# Patient Record
Sex: FEMALE | Race: WHITE | NOT HISPANIC OR LATINO | Employment: FULL TIME | URBAN - METROPOLITAN AREA
[De-identification: names, ages, dates, MRNs, and addresses within clinical notes are randomized per-mention and may not be internally consistent; named-entity substitution may affect disease eponyms.]

---

## 2017-01-30 ENCOUNTER — HOSPITAL ENCOUNTER (OUTPATIENT)
Dept: SLEEP MEDICINE | Facility: HOSPITAL | Age: 37
Discharge: HOME | End: 2017-01-30
Attending: PSYCHIATRY & NEUROLOGY | Admitting: PSYCHIATRY & NEUROLOGY

## 2019-10-08 ENCOUNTER — HOSPITAL ENCOUNTER (EMERGENCY)
Facility: HOSPITAL | Age: 39
Discharge: HOME | End: 2019-10-09
Admitting: EMERGENCY MEDICINE

## 2019-10-08 ENCOUNTER — APPOINTMENT (OUTPATIENT)
Dept: CT IMAGING | Facility: HOSPITAL | Age: 39
End: 2019-10-08

## 2019-10-08 ENCOUNTER — APPOINTMENT (OUTPATIENT)
Dept: GENERAL RADIOLOGY | Facility: HOSPITAL | Age: 39
End: 2019-10-08

## 2019-10-08 DIAGNOSIS — R42 LIGHTHEADED: ICD-10-CM

## 2019-10-08 DIAGNOSIS — R07.89 ATYPICAL CHEST PAIN: Primary | ICD-10-CM

## 2019-10-08 DIAGNOSIS — F41.9 ANXIETY: ICD-10-CM

## 2019-10-08 LAB
ALBUMIN SERPL-MCNC: 3.9 G/DL (ref 3.5–4.8)
ALBUMIN/GLOB SERPL: 1.3 G/DL (ref 1–1.7)
ALP SERPL-CCNC: 39 U/L (ref 32–91)
ALT SERPL W P-5'-P-CCNC: 17 U/L (ref 14–54)
ANION GAP SERPL CALCULATED.3IONS-SCNC: 12 MMOL/L (ref 5–15)
AST SERPL-CCNC: 18 U/L (ref 15–41)
BASOPHILS # BLD AUTO: 0 10*3/MM3 (ref 0–0.2)
BASOPHILS NFR BLD AUTO: 1.4 % (ref 0–1.5)
BILIRUB SERPL-MCNC: 0.7 MG/DL (ref 0.3–1.2)
BNP SERPL-MCNC: 14 PG/ML
BUN BLD-MCNC: 8 MG/DL (ref 8–20)
BUN/CREAT SERPL: 11.4 (ref 5.4–26.2)
CALCIUM SPEC-SCNC: 8.6 MG/DL (ref 8.9–10.3)
CHLORIDE SERPL-SCNC: 102 MMOL/L (ref 101–111)
CO2 SERPL-SCNC: 28 MMOL/L (ref 22–32)
CREAT BLD-MCNC: 0.7 MG/DL (ref 0.4–1)
D DIMER PPP FEU-MCNC: 0.38 MCGFEU/ML (ref 0.17–0.59)
DEPRECATED RDW RBC AUTO: 38.5 FL (ref 37–54)
EOSINOPHIL # BLD AUTO: 0 10*3/MM3 (ref 0–0.4)
EOSINOPHIL NFR BLD AUTO: 1.2 % (ref 0.3–6.2)
ERYTHROCYTE [DISTWIDTH] IN BLOOD BY AUTOMATED COUNT: 12.3 % (ref 12.3–15.4)
GFR SERPL CREATININE-BSD FRML MDRD: 94 ML/MIN/1.73
GLOBULIN UR ELPH-MCNC: 3 GM/DL (ref 2.5–3.8)
GLUCOSE BLD-MCNC: 108 MG/DL (ref 65–99)
HCT VFR BLD AUTO: 33.3 % (ref 34–46.6)
HGB BLD-MCNC: 11.8 G/DL (ref 12–15.9)
LIPASE SERPL-CCNC: 24 U/L (ref 22–51)
LYMPHOCYTES # BLD AUTO: 1.1 10*3/MM3 (ref 0.7–3.1)
LYMPHOCYTES NFR BLD AUTO: 30.1 % (ref 19.6–45.3)
MCH RBC QN AUTO: 31.6 PG (ref 26.6–33)
MCHC RBC AUTO-ENTMCNC: 35.6 G/DL (ref 31.5–35.7)
MCV RBC AUTO: 88.9 FL (ref 79–97)
MONOCYTES # BLD AUTO: 0.4 10*3/MM3 (ref 0.1–0.9)
MONOCYTES NFR BLD AUTO: 11.3 % (ref 5–12)
NEUTROPHILS # BLD AUTO: 2 10*3/MM3 (ref 1.7–7)
NEUTROPHILS NFR BLD AUTO: 56 % (ref 42.7–76)
NRBC BLD AUTO-RTO: 0.1 /100 WBC (ref 0–0.2)
PLATELET # BLD AUTO: 224 10*3/MM3 (ref 140–450)
PMV BLD AUTO: 7.5 FL (ref 6–12)
POTASSIUM BLD-SCNC: 3 MMOL/L (ref 3.6–5.1)
PROT SERPL-MCNC: 6.9 G/DL (ref 6.1–7.9)
RBC # BLD AUTO: 3.74 10*6/MM3 (ref 3.77–5.28)
SODIUM BLD-SCNC: 139 MMOL/L (ref 136–144)
TROPONIN I SERPL-MCNC: <0.03 NG/ML (ref 0–0.03)
WBC NRBC COR # BLD: 3.6 10*3/MM3 (ref 3.4–10.8)

## 2019-10-08 PROCEDURE — 85025 COMPLETE CBC W/AUTO DIFF WBC: CPT | Performed by: NURSE PRACTITIONER

## 2019-10-08 PROCEDURE — 71045 X-RAY EXAM CHEST 1 VIEW: CPT

## 2019-10-08 PROCEDURE — 99284 EMERGENCY DEPT VISIT MOD MDM: CPT

## 2019-10-08 PROCEDURE — 83880 ASSAY OF NATRIURETIC PEPTIDE: CPT | Performed by: NURSE PRACTITIONER

## 2019-10-08 PROCEDURE — 80053 COMPREHEN METABOLIC PANEL: CPT | Performed by: NURSE PRACTITIONER

## 2019-10-08 PROCEDURE — 93005 ELECTROCARDIOGRAM TRACING: CPT | Performed by: NURSE PRACTITIONER

## 2019-10-08 PROCEDURE — 70450 CT HEAD/BRAIN W/O DYE: CPT

## 2019-10-08 PROCEDURE — 83690 ASSAY OF LIPASE: CPT | Performed by: NURSE PRACTITIONER

## 2019-10-08 PROCEDURE — 84484 ASSAY OF TROPONIN QUANT: CPT | Performed by: NURSE PRACTITIONER

## 2019-10-08 PROCEDURE — 85379 FIBRIN DEGRADATION QUANT: CPT | Performed by: NURSE PRACTITIONER

## 2019-10-08 RX ORDER — POTASSIUM CHLORIDE 20 MEQ/1
20 TABLET, EXTENDED RELEASE ORAL DAILY
Status: DISCONTINUED | OUTPATIENT
Start: 2019-10-08 | End: 2019-10-09 | Stop reason: HOSPADM

## 2019-10-08 RX ORDER — BUSPIRONE HYDROCHLORIDE 5 MG/1
7.5 TABLET ORAL 2 TIMES DAILY
COMMUNITY

## 2019-10-08 RX ORDER — ESCITALOPRAM OXALATE 10 MG/1
10 TABLET ORAL DAILY
COMMUNITY

## 2019-10-08 RX ORDER — SODIUM CHLORIDE 0.9 % (FLUSH) 0.9 %
10 SYRINGE (ML) INJECTION AS NEEDED
Status: DISCONTINUED | OUTPATIENT
Start: 2019-10-08 | End: 2019-10-09 | Stop reason: HOSPADM

## 2019-10-08 RX ADMIN — POTASSIUM CHLORIDE 20 MEQ: 1500 TABLET, EXTENDED RELEASE ORAL at 22:37

## 2019-10-08 RX ADMIN — SODIUM CHLORIDE 500 ML: 900 INJECTION, SOLUTION INTRAVENOUS at 22:12

## 2019-10-09 VITALS
DIASTOLIC BLOOD PRESSURE: 93 MMHG | WEIGHT: 229.06 LBS | TEMPERATURE: 98.4 F | SYSTOLIC BLOOD PRESSURE: 106 MMHG | BODY MASS INDEX: 31.03 KG/M2 | HEART RATE: 93 BPM | HEIGHT: 72 IN | RESPIRATION RATE: 18 BRPM | OXYGEN SATURATION: 100 %

## 2019-10-09 NOTE — ED NOTES
Pt reports she is under a lot of stress at work, pt reports chest tightness this morning around 0530, pt reports short intermitted chest tightness throughout today, pt reports she thinks it might be panic attacks, pt also reports she has been constipated x 3 days and had a diarrhea episode this morning, pt reports abdominal fullness, pt also c/o of dull intermitted headaches starting today.     Elizabeth Grajeda, RN  10/08/19 2057

## 2019-10-09 NOTE — DISCHARGE INSTRUCTIONS
Ensure adequate fluid intake, good healthy diet, attempt to employee other methods for anxiety, removed self from situation, listen to music, light massage,; 10 your medications as prescribed by your primary care physician    Follow-up with cardiology referral below for further evaluation of symptoms.    Return to the ER for any worsening symptoms including increase or change in pain, shortness of breath, irregular heartbeat rapid heartbeat fainting near fainting episodes or other worsening symptoms

## 2019-10-09 NOTE — ED PROVIDER NOTES
Subjective   Context: 39-year-old female patient presents the ER with complaint of developing chest tightness; patient reports she been very stressed at work, she states she recently accepted a new position, she states that she is having trouble adjusting.  She reports that at 530 this morning she developed a tightness to the right side of her chest, nonradiating.  She states that it lasted approximately 5 to 10 minutes.  She reports she was thinking about her job whenever the pain began.  She states she had no associated shortness of breath.  She states she did become very hot, felt briefly lightheaded.  Patient reports that due to her increased anxiety she recently resumed her [employer], she states that she has had minimal improvement in her anxiety symptoms.  She reports that she had a dull headache today she states that it was gradual onset with no thunderclap.  Patient reports no hopelessness denies SI HI, she states that she has had no anginal equivalent chest pain tachycardia irregular heartbeat fainting or near fainting episodes.      Location: R chest   Quality:tightness  Timin  Duration: brief  Aggravating: thinking about work  Alleviating:deep breathing    PCP:  None            Review of Systems   Constitutional: Negative for chills, diaphoresis and fever.        Felt hot   HENT: Negative for nosebleeds, sinus pressure, sinus pain and trouble swallowing.    Eyes: Negative for visual disturbance.   Respiratory: Positive for chest tightness. Negative for apnea, cough, shortness of breath, wheezing and stridor.    Cardiovascular: Negative for chest pain, palpitations and leg swelling.   Gastrointestinal: Positive for nausea. Negative for abdominal pain, constipation, diarrhea and vomiting.   Genitourinary: Negative for difficulty urinating and hematuria.   Musculoskeletal: Negative for back pain, joint swelling and myalgias.   Skin: Negative for color change, pallor and rash.   Neurological: Negative for  dizziness, syncope, light-headedness, numbness and headaches.   Hematological: Does not bruise/bleed easily.     Prior to Admission medications    Medication Sig Start Date End Date Taking? Authorizing Provider   busPIRone (BUSPAR) 5 MG tablet Take 7.5 mg by mouth 2 (Two) Times a Day.   Yes ProviderHunter MD   escitalopram (LEXAPRO) 10 MG tablet Take 10 mg by mouth Daily.   Yes Provider, MD Hunter         Past Medical History:   Diagnosis Date   • Depression    • Preeclampsia 2006       Allergies   Allergen Reactions   • Sulfa Antibiotics Hives       Past Surgical History:   Procedure Laterality Date   •  SECTION         History reviewed. No pertinent family history.    Social History     Socioeconomic History   • Marital status:      Spouse name: Not on file   • Number of children: Not on file   • Years of education: Not on file   • Highest education level: Not on file   Tobacco Use   • Smoking status: Never Smoker   Substance and Sexual Activity   • Alcohol use: No     Frequency: Never   • Drug use: No   • Sexual activity: Defer           Objective   Physical Exam       Vital signs and triage nurse note reviewed.   Constitutional: Awake, alert; well-developed and well-nourished. No acute distress is noted.   HEENT: Normocephalic, atraumatic; pupils are PERRL with intact EOM; oropharynx is pink and moist without exudate or erythema.   Neck: Supple, full range of motion without pain; trachea midline without palpable mass cardiovascular: Regular rate and rhythm, normal S1-S2.   Pulmonary: Respiratory effort regular nonlabored, breath sounds clear to auscultation all fields.  Reproducible chest wall tenderness is noted to the right anterior upper chest, no overlying erythema or ecchymosis no flail segment or subcu emphysema   Abdomen: Soft, nontender nondistended with normoactive bowel sounds; no rebound or guarding.   Musculoskeletal: Independent range of motion of all extremities with no  palpable tenderness or edema.  Negative Homans sign bilateral lower extremities   Neuro: Alert oriented x3, speech is clear and appropriate, GCS 15   Skin:  Fleshtone warm, dry, intact; no erythematous or petechial rash or lesion    Procedures           ED Course    Ct Head Without Contrast    Result Date: 10/8/2019  1.  Normal exam.  Electronically Signed ByRyan Morataya On:10/8/2019 10:24 PM This report was finalized on [id number] by  Michelle Unger    Xr Chest 1 View    Result Date: 10/8/2019  1.  No evidence of active disease.   Electronically Signed ByRyan Morataya On:10/8/2019 9:38 PM This report was finalized on [id number] by  Janet Morataya .    Results for orders placed or performed during the hospital encounter of 10/08/19   Comprehensive Metabolic Panel   Result Value Ref Range    Glucose 108 (H) 65 - 99 mg/dL    BUN 8 8 - 20 mg/dL    Creatinine 0.70 0.40 - 1.00 mg/dL    Sodium 139 136 - 144 mmol/L    Potassium 3.0 (L) 3.6 - 5.1 mmol/L    Chloride 102 101 - 111 mmol/L    CO2 28.0 22.0 - 32.0 mmol/L    Calcium 8.6 (L) 8.9 - 10.3 mg/dL    Total Protein 6.9 6.1 - 7.9 g/dL    Albumin 3.90 3.50 - 4.80 g/dL    ALT (SGPT) 17 14 - 54 U/L    AST (SGOT) 18 15 - 41 U/L    Alkaline Phosphatase 39 32 - 91 U/L    Total Bilirubin 0.7 0.3 - 1.2 mg/dL    eGFR Non African Amer 94 >60 mL/min/1.73    Globulin 3.0 2.5 - 3.8 gm/dL    A/G Ratio 1.3 1.0 - 1.7 g/dL    BUN/Creatinine Ratio 11.4 5.4 - 26.2    Anion Gap 12.0 5.0 - 15.0 mmol/L   Lipase   Result Value Ref Range    Lipase 24 22 - 51 U/L   D-dimer, Quantitative   Result Value Ref Range    D-Dimer, Quantitative 0.38 0.17 - 0.59 MCGFEU/mL   Troponin   Result Value Ref Range    Troponin I <0.030 0.000 - 0.030 ng/mL   BNP   Result Value Ref Range    BNP 14.0 <=100.0 pg/mL   CBC Auto Differential   Result Value Ref Range    WBC 3.60 3.40 - 10.80 10*3/mm3    RBC 3.74 (L) 3.77 - 5.28 10*6/mm3    Hemoglobin 11.8 (L) 12.0 - 15.9 g/dL    Hematocrit 33.3 (L) 34.0 - 46.6 %  "   MCV 88.9 79.0 - 97.0 fL    MCH 31.6 26.6 - 33.0 pg    MCHC 35.6 31.5 - 35.7 g/dL    RDW 12.3 12.3 - 15.4 %    RDW-SD 38.5 37.0 - 54.0 fl    MPV 7.5 6.0 - 12.0 fL    Platelets 224 140 - 450 10*3/mm3    Neutrophil % 56.0 42.7 - 76.0 %    Lymphocyte % 30.1 19.6 - 45.3 %    Monocyte % 11.3 5.0 - 12.0 %    Eosinophil % 1.2 0.3 - 6.2 %    Basophil % 1.4 0.0 - 1.5 %    Neutrophils, Absolute 2.00 1.70 - 7.00 10*3/mm3    Lymphocytes, Absolute 1.10 0.70 - 3.10 10*3/mm3    Monocytes, Absolute 0.40 0.10 - 0.90 10*3/mm3    Eosinophils, Absolute 0.00 0.00 - 0.40 10*3/mm3    Basophils, Absolute 0.00 0.00 - 0.20 10*3/mm3    nRBC 0.1 0.0 - 0.2 /100 WBC     Medications   sodium chloride 0.9 % flush 10 mL (not administered)   potassium chloride (K-DUR,KLOR-CON) CR tablet 20 mEq (20 mEq Oral Given 10/8/19 2237)   sodium chloride 0.9 % bolus 500 mL (0 mL Intravenous Stopped 10/8/19 2237)     /93   Pulse 93   Temp 98.4 °F (36.9 °C) (Oral)   Resp 18   Ht 182.9 cm (72\")   Wt 104 kg (229 lb 0.9 oz)   SpO2 100%   BMI 31.07 kg/m²             MDM  Number of Diagnoses or Management Options  Anxiety:   Atypical chest pain:   Lightheaded:      Amount and/or Complexity of Data Reviewed  Clinical lab tests: reviewed  Tests in the radiology section of CPT®: reviewed  Tests in the medicine section of CPT®: reviewed    Risk of Complications, Morbidity, and/or Mortality  General comments: Comorbidities:  Past medical history, allergies, current medications family history social history noted above      Review and summarization of lab specimens, radiology:  ED tests reviewed by me    Differentials: AMI, NSTEMI, ACS, muscular skeletal pain, PE, DVT, electrolyte imbalance dehydration anxiety; this list is not all inclusive and does not constitute the entirety of considered causes                On repeat examination the patient is resting comfortably she reports she has had no repeat episodes of similar pain;, at this time disposition was " discussed and the patient states she would prefer discharge home, she states she will follow-up with primary care and cardiology referral, patient is low risk for cardiac disease but is aware of the differential diagnosis and the importance of continued follow-up.  I reviewed signs and symptoms require immediate return to ED she voices understanding.  Reviewed recommendations for alleviating anxiety including at home therapies, voices understanding.  Patient is discharged improved stable condition ambulatory with an upright steady gait.    Final diagnoses:   Atypical chest pain   Lightheaded   Anxiety              Shari Salcedo NP  10/09/19 0002

## 2021-08-30 ENCOUNTER — TRANSCRIBE ORDERS (OUTPATIENT)
Dept: PHYSICAL THERAPY | Facility: CLINIC | Age: 41
End: 2021-08-30

## 2021-08-30 DIAGNOSIS — M25.611 DECREASED RIGHT SHOULDER RANGE OF MOTION: Primary | ICD-10-CM
